# Patient Record
Sex: MALE | Race: WHITE
[De-identification: names, ages, dates, MRNs, and addresses within clinical notes are randomized per-mention and may not be internally consistent; named-entity substitution may affect disease eponyms.]

---

## 2018-09-14 ENCOUNTER — HOSPITAL ENCOUNTER (EMERGENCY)
Dept: HOSPITAL 43 - DL.ED | Age: 58
Discharge: HOME | End: 2018-09-14
Payer: COMMERCIAL

## 2018-09-14 DIAGNOSIS — M10.9: Primary | ICD-10-CM

## 2018-09-14 DIAGNOSIS — Z79.899: ICD-10-CM

## 2018-09-14 DIAGNOSIS — E03.9: ICD-10-CM

## 2018-09-14 LAB
ANION GAP SERPL CALC-SCNC: 16.4 MMOL/L
CHLORIDE SERPL-SCNC: 102 MMOL/L (ref 101–111)
SODIUM SERPL-SCNC: 137 MMOL/L (ref 135–145)

## 2018-09-14 NOTE — EDM.PDOC
ED HPI GENERAL MEDICAL PROBLEM





- General


Chief Complaint: General


Stated Complaint: GOUT [id number]


Time Seen by Provider: 09/14/18 23:15


Source of Information: Reports: Patient


History Limitations: Reports: No Limitations





- History of Present Illness


INITIAL COMMENTS - FREE TEXT/NARRATIVE: 





just started gout attack this Monday saw PMD and Tx with pred which made him 

nauseous, states on kidney transplant list and unable to take NSAIDS. has oxy 

already and can take those. was told my PMD his blood count was low. 


Treatments PTA: Reports: Other Medication(s)


  ** Right Feet


Pain Score (Numeric/FACES): 1





- Related Data


 Allergies











Allergy/AdvReac Type Severity Reaction Status Date / Time


 


No Known Allergies Allergy   Verified 09/14/18 20:44











Home Meds: 


 Home Meds





Calcitriol 0.25 mg PO DAILY 09/14/18 [History]


Famotidine 20 mg PO DAILY 09/14/18 [History]


Hydrocodone/Acetaminophen [Hydrocodon-Acetaminophen 5-325] 1 tab PO Q6HR PRN 09/ 14/18 [History]


Isosorbide Dinitrate 10 mg PO TID 09/14/18 [History]


Levothyroxine 75 mcg PO DAILY 09/14/18 [History]


Metoprolol Tartrate 25 mg PO BID 09/14/18 [History]


Sennosides/Docusate Sodium [Senna-Docusate Sodium] 1 tab PO DAILY 09/14/18 [

History]


amLODIPine [Norvasc] 5 mg PO DAILY 09/14/18 [History]


methylPREDNISolone [Methylprednisolone] 4 mg PO DAILY 09/14/18 [History]











Past Medical History


HEENT History: Reports: None


Cardiovascular History: Reports: None


Respiratory History: Reports: None


Gastrointestinal History: Reports: Cholelithiasis, Chronic Constipation


Genitourinary History: Reports: Other (See Below)


Other Genitourinary History: polycystic kidney


Musculoskeletal History: Reports: None


Neurological History: Reports: None


Psychiatric History: Reports: None


Endocrine/Metabolic History: Reports: Hypothyroidism


Hematologic History: Reports: None


Immunologic History: Reports: None


Oncologic (Cancer) History: Reports: None


Dermatologic History: Reports: None





- Infectious Disease History


Infectious Disease History: Reports: Chicken Pox, Mumps





- Past Surgical History


GI Surgical History: Reports: Colonoscopy





Social & Family History





- Family History


Family Medical History: Noncontributory





- Tobacco Use


Smoking Status *Q: Never Smoker


Second Hand Smoke Exposure: No





- Caffeine Use


Caffeine Use: Reports: Soda





- Recreational Drug Use


Recreational Drug Use: No





ED ROS GENERAL





- Review of Systems


Review Of Systems: ROS reveals no pertinent complaints other than HPI.





ED EXAM, GENERAL





- Physical Exam


Exam: See Below


Exam Limited By: No Limitations


General Appearance: Alert, WD/WN, Mild Distress, Other (right big toe pain)


Ears: Hearing Grossly Normal


Throat/Mouth: Normal Voice, No Airway Compromise


Head: Atraumatic


Neck: Non-Tender, Full Range of Motion


Respiratory/Chest: No Respiratory Distress


Cardiovascular: Regular Rate, Rhythm


GI/Abdominal: Soft, Non-Tender


Extremities: Other (right big toe swollen red tender)


Neurological: Alert, Oriented, Normal Cognition, Normal Gait, No Motor/Sensory 

Deficits


Psychiatric: Normal Affect, Normal Mood


Skin Exam: Warm, Dry, Normal Color


Lymphatic: No Adenopathy





Course





- Vital Signs


Last Recorded V/S: 





 Last Vital Signs











Temp  37.3 C   09/14/18 22:59


 


Pulse  76   09/14/18 22:59


 


Resp  16   09/14/18 22:59


 


BP  135/86   09/14/18 22:59


 


Pulse Ox  97   09/14/18 22:59














- Orders/Labs/Meds


Labs: 





 Laboratory Tests











  09/14/18 09/14/18 Range/Units





  22:36 22:36 


 


WBC  16.0 H   (5.0-10.0)  10^3/uL


 


RBC  4.64   (4.6-6.2)  10^6/uL


 


Hgb  13.4 L   (14.0-18.0)  g/dL


 


Hct  39.3 L   (40.0-54.0)  %


 


MCV  84.7   ()  fL


 


MCH  28.9   (27.0-34.0)  pg


 


MCHC  34.1   (33.0-35.0)  g/dL


 


Plt Count  414   (150-450)  10^3/uL


 


Neut % (Auto)  84.6 H   (42.2-75.2)  %


 


Lymph % (Auto)  9.1 L   (20.5-50.1)  %


 


Mono % (Auto)  5.7   (2-8)  %


 


Eos % (Auto)  0.3 L   (1.0-3.0)  %


 


Baso % (Auto)  0.3   (0.0-1.0)  %


 


Sodium   137  (135-145)  mmol/L


 


Potassium   4.4  (3.6-5.0)  mmol/L


 


Chloride   102  (101-111)  mmol/L


 


Carbon Dioxide   23.0  (21.0-31.0)  mmol/L


 


Anion Gap   16.4  


 


BUN   32 H  (7-18)  mg/dL


 


Creatinine   4.3 H  (0.6-1.3)  mg/dL


 


Est Cr Clr Drug Dosing   20.43  mL/min


 


Estimated GFR (MDRD)   14  


 


BUN/Creatinine Ratio   7.44  


 


Glucose   176 H  ()  mg/dL


 


Calcium   9.6  (8.4-10.2)  mg/dl


 


Total Bilirubin   0.7  (0.2-1.0)  mg/dL


 


AST   20  (10-42)  IU/L


 


ALT   10  (10-60)  IU/L


 


Alkaline Phosphatase   68  ()  IU/L


 


Total Protein   7.9  (6.7-8.2)  g/dl


 


Albumin   4.2  (3.2-5.5)  g/dl


 


Globulin   3.7  


 


Albumin/Globulin Ratio   1.14  














- Re-Assessments/Exams


Free Text/Narrative Re-Assessment/Exam: 





09/14/18 23:17


results discussed with pt & spouse





Departure





- Departure


Time of Disposition: 23:18


Disposition: Home, Self-Care 01


Condition: Good


Clinical Impression: 


Gout attack


Qualifiers:


 Gout site: toe Gout etiology: unspecified cause Laterality: right Qualified 

Code(s): M10.9 - Gout, unspecified








- Discharge Information


Instructions:  Low-Purine Eating Plan


Additional Instructions: 


1) elevate foot as much as possible


2) try heat pad to area


3) follow up with family doctor or recheck if there is any change or concern